# Patient Record
Sex: FEMALE | Race: WHITE | Employment: FULL TIME | URBAN - METROPOLITAN AREA
[De-identification: names, ages, dates, MRNs, and addresses within clinical notes are randomized per-mention and may not be internally consistent; named-entity substitution may affect disease eponyms.]

---

## 2019-07-19 ENCOUNTER — OFFICE VISIT (OUTPATIENT)
Dept: FAMILY MEDICINE CLINIC | Age: 44
End: 2019-07-19
Payer: COMMERCIAL

## 2019-07-19 VITALS
WEIGHT: 116 LBS | HEIGHT: 60 IN | RESPIRATION RATE: 16 BRPM | BODY MASS INDEX: 22.78 KG/M2 | SYSTOLIC BLOOD PRESSURE: 123 MMHG | HEART RATE: 67 BPM | DIASTOLIC BLOOD PRESSURE: 70 MMHG

## 2019-07-19 DIAGNOSIS — Z12.39 SCREENING FOR BREAST CANCER: ICD-10-CM

## 2019-07-19 DIAGNOSIS — Z00.00 ROUTINE CHECK-UP: Primary | ICD-10-CM

## 2019-07-19 PROCEDURE — 99396 PREV VISIT EST AGE 40-64: CPT | Performed by: FAMILY MEDICINE

## 2019-07-19 ASSESSMENT — PATIENT HEALTH QUESTIONNAIRE - PHQ9
SUM OF ALL RESPONSES TO PHQ QUESTIONS 1-9: 0
1. LITTLE INTEREST OR PLEASURE IN DOING THINGS: 0
SUM OF ALL RESPONSES TO PHQ9 QUESTIONS 1 & 2: 0
SUM OF ALL RESPONSES TO PHQ QUESTIONS 1-9: 0
2. FEELING DOWN, DEPRESSED OR HOPELESS: 0

## 2019-07-19 NOTE — PROGRESS NOTES
Chief complaint : 44 year- old presents for well woman exam.     Present illness :    G2, P2,Ab0. Doing well. Periods:  regular. Sexually active : yes  . No abdominal or pelvic pain. No dyspareunia. No abnormal vaginal  discharge. Previous Pap smears normal. Last Pap smear 2016 normal and HPV neg. No urinary or GI symptoms. Medical/ surgical history and medications reviewed. Examination :  Vital signs reviewed. GA : Healthy. Oriented x 3 no distress. HEENT : hearing grossly intact, no jaundice, no oral lesions or nasal discharge. Neck : Supple. Thyroid : normal, no goiter. Breasts : no masses. No skin changes or lymphadenopathy. No nipple discharge. Abdomen :Soft. No masses. No organomegaly. V&V : Normal female external genitalia. BUS: Normal.  PS : Adequate. Cervix : No lesions, pap smear not done. Uterus : Normal size. Adnexa : Free, no masses. BMI was below limits today and the following plan was recommended: regular diet. Assessment / Plan :  No family history of breast cancer  Mammogram  Then every other year                The patient was informed about the importance of regular gynecological  examination and pap smear. She was also  informed of the need for mammogram after the age of 36. After age 48 ,a colonoscopy should be scheduled through her primary care physician (PCP). This will help decrease the risk of colon cancer. During the reproductive years, she should take folic acid daily in order to decrease the risk of neural tube defects such as spina bifida. Adequate calcium and vitamin D intake should be added to a healthy diet ,and weight bearing exercise continued daily for improved cardiovascular and bone health. The patient was reminded of the importance of safe sexual practices including a mutually monogamous relationship and the use of  barrier contraception.   All vaccinations such as flu, tetanus, gardasil ( for cervical cancer ), meningitis, pneumonia and shingles should be updated by her PCP. If she is not established with a PCP, she may schedule an appointment at the medical clinic or at the [facility]. All questions have been answered.

## 2020-01-31 ENCOUNTER — OFFICE VISIT (OUTPATIENT)
Dept: FAMILY MEDICINE CLINIC | Age: 45
End: 2020-01-31
Payer: COMMERCIAL

## 2020-01-31 VITALS
RESPIRATION RATE: 18 BRPM | HEART RATE: 96 BPM | BODY MASS INDEX: 22.07 KG/M2 | OXYGEN SATURATION: 98 % | DIASTOLIC BLOOD PRESSURE: 70 MMHG | SYSTOLIC BLOOD PRESSURE: 108 MMHG | WEIGHT: 113 LBS | TEMPERATURE: 98.2 F

## 2020-01-31 PROCEDURE — 99213 OFFICE O/P EST LOW 20 MIN: CPT | Performed by: FAMILY MEDICINE

## 2020-01-31 NOTE — PROGRESS NOTES
20  Sean Foster : 1975 Sex: female  Age: 38 y.o. Chief Complaint   Patient presents with    Congestion    Sinus Problem    Pharyngitis    Cough     productive at times       HPI  HPI:    Patient presents today with 2 weeks of nasal congestion postnasal drainage. Started with fever body aches for 1 day but resolved. No sinus pain or pressure headaches dizziness or stiff neck but does have a cough that persist, productive at times, no chest pain shortness of breath wheezing or abdominal symptoms. Had flu shot    She denies any chance of being or getting pregnant and not breast-feeding. ROS:  As above    No current outpatient medications on file. No Known Allergies    Past Medical History:   Diagnosis Date    Rhinitis, allergic 2013     Past Surgical History:   Procedure Laterality Date     SECTION      TUBAL LIGATION       Family History   Problem Relation Age of Onset    High Blood Pressure Mother     High Cholesterol Father      Social History     Tobacco Use    Smoking status: Never Smoker    Smokeless tobacco: Never Used   Substance Use Topics    Alcohol use: Yes     Alcohol/week: 1.0 standard drinks     Types: 1 Glasses of wine per week    Drug use: No      Social History     Patient does not qualify to have social determinant information on file (likely too young). Social History Narrative     rosa coyne        Vitals:    20 1639   BP: 108/70   Pulse: 96   Resp: 18   Temp: 98.2 °F (36.8 °C)   TempSrc: Temporal   SpO2: 98%   Weight: 113 lb (51.3 kg)     Wt Readings from Last 3 Encounters:   20 113 lb (51.3 kg)   19 116 lb (52.6 kg)   01/15/18 117 lb (53.1 kg)        Physical Exam    Exam:  Const: Appears comfortable. No signs of acute distress present. Head/Face: Atraumatic, normocephalic on inspection. Eyes: No discharge from the eyes. Sclerae clear.   ENMT:   Ears fluid, nose boggy, oral pharynx thick postnasal drainage no exudate swelling or asymmetry. Neck: Supple. Palpation reveals no adenopathy. No masses appreciated. No JVD. Resp: Respirations are unlabored. Clear to auscultation bilaterally. No rales, rhonchi or wheezes appreciated over the  lungs bilaterally. CV: RRR   Extremities: No clubbing or cyanosis. No edema of the lower limbs  bilaterally. No calf inflammation or tenderness. Abdomen: Abdomen is soft, nontender, and nondistended. No abdominal masses  appreciated. No palpable hepatosplenomegaly. Bowel sounds are normoactive. Skin: Dry and warm with no rash. Muscular skeletal: No acute joint inflammation. Neuro:Grossly intact without focal deficit        Assessment and Plan:    1. Bronchitis  Counseled. Differential reviewed. Antibiotic as per EMR, standard precautions reviewed including C. Difficile. R/B probiotics reviewed. Mucinex as directed with precautions. Potential interactions reviewed. Proper hydration reviewed. Coolmist vaporizer as directed. Mono precautions reviewed. Counseled on nasal saline. Counseled on nasal steroid. -zithromax with precautions incl c-diff, prolonged qt, r/b probiotic reviewed. No problem-specific Assessment & Plan notes found for this encounter. Plan as above. Counseled extensively and differential diagnoses relevant to above were reviewed, including serious etiologies. Side effects and interactions of medications were reviewed. As long as symptoms steadily improve/resolve, and medical conditions follow the expected course, FU next wk, sooner PRN. She heard Dr Marylynn Primrose may be retiring. She may est with me if she would like. No follow-ups on file. Signs and symptoms to watch for discussed, serious signs and symptoms reviewed. ER if any. Blaine Caraballo MD    Patients are advised to check with insurance company to ensure coverage and to fully understand benefits and cost prior to any testing.   This note was created with the assistance of voice recognition software. Document was reviewed however may contain grammatical errors.

## 2020-02-01 RX ORDER — AZITHROMYCIN 250 MG/1
TABLET, FILM COATED ORAL
Qty: 6 TABLET | Refills: 0 | Status: SHIPPED
Start: 2020-02-01 | End: 2020-03-17

## 2020-02-01 RX ORDER — AZITHROMYCIN 250 MG/1
250 TABLET, FILM COATED ORAL DAILY
COMMUNITY
End: 2020-02-01 | Stop reason: SDUPTHER

## 2020-03-17 ENCOUNTER — OFFICE VISIT (OUTPATIENT)
Dept: FAMILY MEDICINE CLINIC | Age: 45
End: 2020-03-17
Payer: COMMERCIAL

## 2020-03-17 VITALS
WEIGHT: 117 LBS | DIASTOLIC BLOOD PRESSURE: 82 MMHG | RESPIRATION RATE: 16 BRPM | HEIGHT: 60 IN | SYSTOLIC BLOOD PRESSURE: 119 MMHG | HEART RATE: 77 BPM | BODY MASS INDEX: 22.97 KG/M2

## 2020-03-17 PROCEDURE — 99396 PREV VISIT EST AGE 40-64: CPT | Performed by: FAMILY MEDICINE

## 2020-03-17 ASSESSMENT — PATIENT HEALTH QUESTIONNAIRE - PHQ9
SUM OF ALL RESPONSES TO PHQ QUESTIONS 1-9: 0
1. LITTLE INTEREST OR PLEASURE IN DOING THINGS: 0
SUM OF ALL RESPONSES TO PHQ QUESTIONS 1-9: 0
2. FEELING DOWN, DEPRESSED OR HOPELESS: 0
SUM OF ALL RESPONSES TO PHQ9 QUESTIONS 1 & 2: 0

## 2020-03-17 NOTE — PROGRESS NOTES
Chief complaint :Papo Ku  here for a yearly physical evaluation . Present illness:    Doing well overall. Concerns : none  Medications : list reviewed and updated. Exercise : yes walking and bike riding  Diet : tries to follow a diet that includes food from a variety of groups while limiting fatty foods and excessive salt . Recent labs reviewed and discussed with patient. Regular menses  Pap tests up to date and normal  Will have mammogram soon. reports that she has never smoked. She has never used smokeless tobacco.   reports current alcohol use of about 1.0 standard drinks of alcohol per week. Past Medical History:   Diagnosis Date    Rhinitis, allergic 2013       Past Surgical History:   Procedure Laterality Date     SECTION      TUBAL LIGATION         Current Outpatient Medications   Medication Sig Dispense Refill    azithromycin (ZITHROMAX) 250 MG tablet Take 2 tablets day 1 then 1 tablet days 2-5 6 tablet 0     No current facility-administered medications for this visit. Family History   Problem Relation Age of Onset    High Blood Pressure Mother     High Cholesterol Father        Allergies:  Patient has no known allergies. Review of systems:    No headaches, no visual disturbances. No weakness, no numbness. No depression, no anxiety. No rhinorrhea. No sore throat, no hearing loss. No cough, no dyspnea. No chest pain on exertion. No edema. No intermittent claudication. No abdominal pain. No nausea, no change in bowel habits. No joint pain, no swelling. No skin lesions, no rash. No heat or cold intolerance. No weight changes. No flank pain, no hematuria. No genital lesions or abnormalities. Examination:      GA: alert, oriented x 3, no distress. HEENT : unremarkable. Nonicteric sclerae. Hearing grossly intact. Oral cavity: no lesions or ulcers. Nasal mucosa: no discharge. Neck : supple, no masses, no goiter and no bruits.   Chest : no deformities. Lungs : clear, no wheezing or crackles. No consolidation. Heart : regular, normal S1S2, no murmurs. Abdomen : soft, no hepatomegaly or splenomegaly. No masses or bruits. Extremities : no edema. Musculoskeletal : no deformities. No effusion or joint tenderness. Back : no deformities and normal ROM. No CVA tenderness. Vascular : normal dorsalis pedis and posterior tibial pulses bilaterally. Skin : no significant rash or lesions. Neurological : normal speech and judgement. Normal gait. No weakness or focal deficits. Psychiatric : well groomed, normal thought content, normal mood. Affect congruent. BMI was below limits today and the following plan was recommended: general diet. No visits with results within 3 Month(s) from this visit. Latest known visit with results is:   Hospital Outpatient Visit on 12/20/2016   Component Date Value    CORRESPONDING PAP CASE # 12/20/2016 SSR-     HPV, High Risk 12/20/2016 NEGATIVE          Assessment / Plan:    Here for yearly check-up. Doing well  Health maintenance is up to date. 1. Vaccinations reviewed with patient. Patient fully informed about indications, risks and alternatives of vaccines. All questions answered. 2. Screening test or procedures reviewed and discussed all as recommended by the USPSTF and other guidelines. Patient fully informed of benefits and risks. All questions answered. Discussed diet and weight loss , encouraged to perform cardio exercise regularly. Anticipatory guidance provided. Follow-up yearly or sooner as needed.

## 2020-05-19 ENCOUNTER — HOSPITAL ENCOUNTER (OUTPATIENT)
Dept: MAMMOGRAPHY | Age: 45
Discharge: HOME | End: 2020-05-21
Payer: COMMERCIAL

## 2020-05-19 ENCOUNTER — HOSPITAL ENCOUNTER (OUTPATIENT)
Age: 45
Discharge: HOME | End: 2020-05-21
Payer: COMMERCIAL

## 2020-05-19 PROCEDURE — 77067 SCR MAMMO BI INCL CAD: CPT

## 2020-05-21 ENCOUNTER — TELEPHONE (OUTPATIENT)
Dept: ONCOLOGY | Age: 45
End: 2020-05-21

## 2020-05-21 NOTE — TELEPHONE ENCOUNTER
Call to patient in reference to her mammogram performed at [address] on May 19, 2020. Instructed patient that the radiologist has recommended some additional breast imaging, in order to make a final determination/result. Informed her that a request for Rx has been faxed to the ordering physician. Once we receive the script a  from [facility] will contact her to schedule the additional imaging study/studies. Verbalizes understanding and is agreeable to proceed.        Jason Fu, RN, BSN, [place] [address] [place] [address]

## 2020-06-03 ENCOUNTER — HOSPITAL ENCOUNTER (OUTPATIENT)
Dept: GENERAL RADIOLOGY | Age: 45
Discharge: HOME | End: 2020-06-05
Payer: COMMERCIAL

## 2020-06-03 PROCEDURE — G0279 TOMOSYNTHESIS, MAMMO: HCPCS

## 2020-06-03 PROCEDURE — 76642 ULTRASOUND BREAST LIMITED: CPT

## 2021-03-24 ENCOUNTER — TELEPHONE (OUTPATIENT)
Dept: FAMILY MEDICINE CLINIC | Age: 46
End: 2021-03-24

## 2021-03-24 NOTE — TELEPHONE ENCOUNTER
Called Pt to see if he is establishing with one of our Dr's or if he has established elsewhere     Left message for a call back

## 2021-07-31 ENCOUNTER — OFFICE VISIT (OUTPATIENT)
Dept: FAMILY MEDICINE CLINIC | Age: 46
End: 2021-07-31
Payer: COMMERCIAL

## 2021-07-31 VITALS
RESPIRATION RATE: 16 BRPM | DIASTOLIC BLOOD PRESSURE: 78 MMHG | HEIGHT: 60 IN | HEART RATE: 81 BPM | SYSTOLIC BLOOD PRESSURE: 118 MMHG | OXYGEN SATURATION: 99 % | WEIGHT: 124 LBS | TEMPERATURE: 97.5 F | BODY MASS INDEX: 24.35 KG/M2

## 2021-07-31 DIAGNOSIS — H66.92 LEFT OTITIS MEDIA, UNSPECIFIED OTITIS MEDIA TYPE: ICD-10-CM

## 2021-07-31 DIAGNOSIS — H61.22 IMPACTED CERUMEN OF LEFT EAR: Primary | ICD-10-CM

## 2021-07-31 PROCEDURE — 69210 REMOVE IMPACTED EAR WAX UNI: CPT | Performed by: PHYSICIAN ASSISTANT

## 2021-07-31 PROCEDURE — 99203 OFFICE O/P NEW LOW 30 MIN: CPT | Performed by: PHYSICIAN ASSISTANT

## 2021-07-31 RX ORDER — AMOXICILLIN 875 MG/1
875 TABLET, COATED ORAL 2 TIMES DAILY
Qty: 20 TABLET | Refills: 0 | Status: SHIPPED | OUTPATIENT
Start: 2021-07-31 | End: 2021-08-10

## 2021-07-31 NOTE — PROGRESS NOTES
21  Rafat Still : 1975 Sex: female  Age 41 y.o. Subjective:  Chief Complaint   Patient presents with    Ear Problem     left ear pain for about a week          HPI:   Rafat Still , 41 y.o. female presents to express care for evaluation of left ear pain    HPI   60-year-old female presents to express care for evaluation of left ear fullness, possible swimmer's ear. The patient was on vacation last week. The patient states that she may have got some water in her left ear. The patient not any chest pain, shortness of breath, back pain, flank pain. The patient is not having any right ear pain. The patient states that she really is not having pain to the left ear other than when she sneezes or burps or causes some increased pressure. The patient states that she has had some difficulty hearing. No drainage or discharge. No nasal congestion, rhinorrhea. ROS:   Unless otherwise stated in this report the patient's positive and negative responses for review of systems for constitutional, eyes, ENT, cardiovascular, respiratory, gastrointestinal, neurological, , musculoskeletal, and integument systems and related systems to the presenting problem are either stated in the history of present illness or were not pertinent or were negative for the symptoms and/or complaints related to the presenting medical problem. Positives and pertinent negatives as per HPI. All others reviewed and are negative.       PMH:     Past Medical History:   Diagnosis Date    Rhinitis, allergic 2013       Past Surgical History:   Procedure Laterality Date     SECTION      TUBAL LIGATION         Family History   Problem Relation Age of Onset    High Blood Pressure Mother     High Cholesterol Father        Medications:     Current Outpatient Medications:     amoxicillin (AMOXIL) 875 MG tablet, Take 1 tablet by mouth 2 times daily for 10 days, Disp: 20 tablet, Rfl: 0   neomycin-polymyxin-hydrocortisone (CORTISPORIN) 3.5-10950-8 otic solution, Place 4 drops into the left ear 3 times daily for 10 days, Disp: 1 Bottle, Rfl: 0    Allergies:   No Known Allergies    Social History:     Social History     Tobacco Use    Smoking status: Never Smoker    Smokeless tobacco: Never Used   Substance Use Topics    Alcohol use: Yes     Alcohol/week: 1.0 standard drinks     Types: 1 Glasses of wine per week    Drug use: No       Patient lives at home. Physical Exam:     Vitals:    07/31/21 0845   BP: 118/78   Pulse: 81   Resp: 16   Temp: 97.5 °F (36.4 °C)   SpO2: 99%   Weight: 124 lb (56.2 kg)   Height: 5' (1.524 m)       Exam:  Physical Exam  Nurse's notes and vital signs reviewed. The patient is not hypoxic. ? General: Alert, no acute distress, patient resting comfortably Patient is not toxic or lethargic. Skin: Warm, intact, no pallor noted. There is no evidence of rash at this time. Head: Normocephalic, atraumatic  Eye: Normal conjunctiva  Ears, Nose, Throat: Right tympanic membrane clear, left tympanic membrane is not able to be visualized because of large amount of cerumen. . No drainage or discharge noted. No pre- or post-auricular tenderness, erythema, or swelling noted. No rhinorrhea or congestion noted. Posterior oropharynx shows no erythema, tonsillar hypertrophy, or exudate. the uvula is midline. No trismus or drooling is noted. Moist mucous membranes. Neck: No anterior/posterior lymphadenopathy noted. No erythema, no masses, no fluctuance or induration noted. No meningeal signs. Cardiovascular: Regular Rate and Rhythm  Respiratory: No acute distress, no rhonchi, wheezing or crackles noted. No stridor or retractions are noted. Neurological: A&O x4, normal speech  Psychiatric: Cooperative         Testing:           Medical Decision Making:     Vital signs reviewed    Past medical history reviewed. Allergies reviewed. Medications reviewed.     Patient on arrival does not appear to be in any apparent distress or discomfort. The patient has been seen and evaluated. The patient does not appear to be toxic or lethargic. The patient had the left tympanic membrane evaluated and there was a large amount of cerumen and was not able to be visualized. The patient consented to the procedure to have irrigation and removal with ear curette. The patient tolerated the procedure well. Repeat evaluation does show quite a bit of irritation and redness noted to the external canal as well as to the tympanic membrane. We will treat the patient with amoxicillin Cortisporin. The patient is to use Motrin, Tylenol, decongestant. Follow-up with PCP. Call with any questions or concerns. The patient is to return to express care or go directly to the emergency department should any of the signs or symptoms worsen. The patient is to followup with primary care physician in 2-3 days for repeat evaluation. The patient has no other questions or concerns at this time the patient will be discharged home. Clinical Impression:   Celeste Cross was seen today for ear problem. Diagnoses and all orders for this visit:    Impacted cerumen of left ear  -     15294 - [place] REMOVE IMPACTED EAR WAX    Left otitis media, unspecified otitis media type    Other orders  -     amoxicillin (AMOXIL) 875 MG tablet; Take 1 tablet by mouth 2 times daily for 10 days  -     neomycin-polymyxin-hydrocortisone (CORTISPORIN) 3.5-36558-9 otic solution; Place 4 drops into the left ear 3 times daily for 10 days        The patient is to call for any concerns or return if any of the signs or symptoms worsen. The patient is to follow-up with PCP in the next 2-3 days for repeat evaluation repeat assessment or go directly to the emergency department.      SIGNATURE: Brandon Márquez III, PA-C

## 2021-09-24 ENCOUNTER — OFFICE VISIT (OUTPATIENT)
Dept: FAMILY MEDICINE CLINIC | Age: 46
End: 2021-09-24
Payer: COMMERCIAL

## 2021-09-24 VITALS
DIASTOLIC BLOOD PRESSURE: 72 MMHG | WEIGHT: 125 LBS | BODY MASS INDEX: 24.41 KG/M2 | HEART RATE: 78 BPM | SYSTOLIC BLOOD PRESSURE: 124 MMHG | OXYGEN SATURATION: 97 % | TEMPERATURE: 97.2 F

## 2021-09-24 DIAGNOSIS — H91.93 BILATERAL HEARING LOSS, UNSPECIFIED HEARING LOSS TYPE: Primary | ICD-10-CM

## 2021-09-24 PROCEDURE — 99213 OFFICE O/P EST LOW 20 MIN: CPT | Performed by: PHYSICIAN ASSISTANT

## 2021-09-24 NOTE — PROGRESS NOTES
21  Katherine Hart : 1975 Sex: female  Age 41 y.o. Subjective:  Chief Complaint   Patient presents with    Hearing Problem     Check ears. Having ear problems          HPI:   Katherine Hart , 41 y.o. female presents to [facility] for evaluation of check ears    HPI  73-year-old female presents to [facility] for evaluation of evaluation of her ears. The patient just want to make sure that she had her ears checked before she goes to the audiologist.  The patient is having some hearing deficits and she is being evaluated for this. She wondered if it was related to wax. She is here to have them evaluated to ensure that there is no significant wax buildup. ROS:   Unless otherwise stated in this report the patient's positive and negative responses for review of systems for constitutional, eyes, ENT, cardiovascular, respiratory, gastrointestinal, neurological, , musculoskeletal, and integument systems and related systems to the presenting problem are either stated in the history of present illness or were not pertinent or were negative for the symptoms and/or complaints related to the presenting medical problem. Positives and pertinent negatives as per HPI. All others reviewed and are negative. PMH:     Past Medical History:   Diagnosis Date    Rhinitis, allergic 2013       Past Surgical History:   Procedure Laterality Date     SECTION      TUBAL LIGATION         Family History   Problem Relation Age of Onset    High Blood Pressure Mother     High Cholesterol Father        Medications:   No current outpatient medications on file. Allergies:   No Known Allergies    Social History:     Social History     Tobacco Use    Smoking status: Never Smoker    Smokeless tobacco: Never Used   Substance Use Topics    Alcohol use:  Yes     Alcohol/week: 1.0 standard drinks     Types: 1 Glasses of wine per week    Drug use: No       Patient lives at problem. Diagnoses and all orders for this visit:    Bilateral hearing loss, unspecified hearing loss type        The patient is to call for any concerns or return if any of the signs or symptoms worsen. The patient is to follow-up with PCP in the next 2-3 days for repeat evaluation repeat assessment or go directly to the emergency department.      SIGNATURE: Christos Bucio III, PA-C

## 2022-09-27 ENCOUNTER — OFFICE VISIT (OUTPATIENT)
Dept: FAMILY MEDICINE CLINIC | Age: 47
End: 2022-09-27
Payer: COMMERCIAL

## 2022-09-27 VITALS
HEART RATE: 80 BPM | RESPIRATION RATE: 18 BRPM | HEIGHT: 60 IN | BODY MASS INDEX: 25.72 KG/M2 | WEIGHT: 131 LBS | SYSTOLIC BLOOD PRESSURE: 103 MMHG | OXYGEN SATURATION: 98 % | TEMPERATURE: 97.9 F | DIASTOLIC BLOOD PRESSURE: 73 MMHG

## 2022-09-27 DIAGNOSIS — Z00.00 PREVENTATIVE HEALTH CARE: Primary | ICD-10-CM

## 2022-09-27 DIAGNOSIS — F41.8 SITUATIONAL ANXIETY: ICD-10-CM

## 2022-09-27 PROCEDURE — 99396 PREV VISIT EST AGE 40-64: CPT | Performed by: FAMILY MEDICINE

## 2022-09-27 RX ORDER — FLUOXETINE 10 MG/1
10 CAPSULE ORAL DAILY
Qty: 30 CAPSULE | Refills: 3 | Status: SHIPPED | OUTPATIENT
Start: 2022-09-27

## 2022-09-27 SDOH — ECONOMIC STABILITY: FOOD INSECURITY: WITHIN THE PAST 12 MONTHS, YOU WORRIED THAT YOUR FOOD WOULD RUN OUT BEFORE YOU GOT MONEY TO BUY MORE.: NEVER TRUE

## 2022-09-27 SDOH — ECONOMIC STABILITY: FOOD INSECURITY: WITHIN THE PAST 12 MONTHS, THE FOOD YOU BOUGHT JUST DIDN'T LAST AND YOU DIDN'T HAVE MONEY TO GET MORE.: NEVER TRUE

## 2022-09-27 ASSESSMENT — PATIENT HEALTH QUESTIONNAIRE - PHQ9
2. FEELING DOWN, DEPRESSED OR HOPELESS: 1
SUM OF ALL RESPONSES TO PHQ QUESTIONS 1-9: 1
1. LITTLE INTEREST OR PLEASURE IN DOING THINGS: 0
SUM OF ALL RESPONSES TO PHQ9 QUESTIONS 1 & 2: 1
SUM OF ALL RESPONSES TO PHQ QUESTIONS 1-9: 1

## 2022-09-27 ASSESSMENT — ANXIETY QUESTIONNAIRES
3. WORRYING TOO MUCH ABOUT DIFFERENT THINGS: 1
1. FEELING NERVOUS, ANXIOUS, OR ON EDGE: 1
7. FEELING AFRAID AS IF SOMETHING AWFUL MIGHT HAPPEN: 0
GAD7 TOTAL SCORE: 7
2. NOT BEING ABLE TO STOP OR CONTROL WORRYING: 1
4. TROUBLE RELAXING: 2
5. BEING SO RESTLESS THAT IT IS HARD TO SIT STILL: 1
6. BECOMING EASILY ANNOYED OR IRRITABLE: 1

## 2022-09-27 ASSESSMENT — SOCIAL DETERMINANTS OF HEALTH (SDOH): HOW HARD IS IT FOR YOU TO PAY FOR THE VERY BASICS LIKE FOOD, HOUSING, MEDICAL CARE, AND HEATING?: NOT HARD AT ALL

## 2022-09-27 NOTE — PROGRESS NOTES
Ines Escobedo (:  1975) is a 40 y.o. female,New patient, here for evaluation of the following chief complaint(s): Annual Exam         ASSESSMENT/PLAN:  1. Preventative health care  -     Lipid Panel; Future  -     Comprehensive Metabolic Panel; Future  -     TSH; Future  -     CBC with Auto Differential; Future  -     Fecal DNA Colorectal cancer screening (Cologuard)  Ed pt on options for colon cancer screening. Pt would prefer cologuard. Pt to RTC for pap. Will get flu anc COVID vaccines at pharmacy. 2. Situational anxiety  -     Comprehensive Metabolic Panel; Future  -     TSH; Future  -     CBC with Auto Differential; Future  -     FLUoxetine (PROZAC) 10 MG capsule; Take 1 capsule by mouth daily, Disp-30 capsule, R-3Normal  Labs ordered. Low dose SSRI started. Pt already fatigued so more activating SSRI started. Will reassess when she RTC for pap. Return in about 3 months (around 2022) for pap. Subjective   SUBJECTIVE/OBJECTIVE:  HPI    Prev: labs, colon cancer screening, cervical cancer screening, depression screening, vaccines. Will get flu and COVID vaccine at the same time at the pharmacy. Needs pap smear. Will RTC for one. Has not had in many years. Anxeity is high some times. Sleeps ok as long as she is not woken up. Can feel anxiety in her chest sometimes. Child has severe illness and has nightmares at night. She had to stop her job in order to care for her child and is now home schooling her other child. Older child is a senior in high school. Younger child is a sophomore and has been ill for about 2 years. She has gotten counseling for it but is not currently in counseling. ARI 7 SCORE 2022   ARI-7 Total Score 7     Interpretation of ARI-7 score: 5-9 = mild anxiety, 10-14 = moderate anxiety, 15+ = severe anxiety. Recommend referral to behavioral health for scores 10 or greater.        Review of Systems   Constitutional:  Positive for fatigue. Negative for activity change, fever and unexpected weight change. Not exercing as much as she used to because of the stress at home. Respiratory:  Negative for cough, chest tightness and shortness of breath. Cardiovascular:  Negative for chest pain and leg swelling. Gastrointestinal:  Negative for abdominal pain, constipation, diarrhea and nausea. Genitourinary:  Negative for difficulty urinating. Psychiatric/Behavioral:  Positive for sleep disturbance. Negative for agitation and dysphoric mood. The patient is nervous/anxious. Very social person - has found ways to stay active socially with friends, in [employer], volunteering. Can't commit to a lot because she has to be available at home. Objective   Physical Exam  Vitals and nursing note reviewed. Constitutional:       Appearance: Normal appearance. She is well-developed. She is not ill-appearing or toxic-appearing. HENT:      Head: Normocephalic and atraumatic. Eyes:      General: No scleral icterus. Right eye: No discharge. Left eye: No discharge. Neck:      Thyroid: No thyromegaly. Vascular: No carotid bruit. Cardiovascular:      Rate and Rhythm: Normal rate and regular rhythm. Heart sounds: Normal heart sounds, S1 normal and S2 normal. No murmur heard. No friction rub. No gallop. Pulmonary:      Effort: Pulmonary effort is normal. No respiratory distress. Breath sounds: Normal breath sounds and air entry. No decreased breath sounds, wheezing or rales. Abdominal:      General: Abdomen is flat. Palpations: Abdomen is soft. There is no mass. Tenderness: There is no abdominal tenderness. Musculoskeletal:      Cervical back: Neck supple. Right lower leg: No edema. Left lower leg: No edema. Lymphadenopathy:      Cervical: No cervical adenopathy. Skin:     General: Skin is warm and dry. Neurological:      General: No focal deficit present. Mental Status: She is alert and oriented to person, place, and time. Gait: Gait normal.   Psychiatric:         Attention and Perception: Attention normal.         Mood and Affect: Mood and affect normal.         Speech: Speech normal.         Behavior: Behavior normal. Behavior is cooperative. Thought Content: Thought content normal.         Cognition and Memory: Cognition and memory normal.         Judgment: Judgment normal.                An electronic signature was used to authenticate this note.     --Shannan Jang MD

## 2022-09-27 NOTE — PATIENT INSTRUCTIONS
Call insurance company and ask about Cologuard. Do they pay for it? Get COVID Booster and Flu vaccine together.

## 2022-09-28 PROBLEM — F41.1 GENERALIZED ANXIETY DISORDER: Status: ACTIVE | Noted: 2022-09-28

## 2022-09-28 PROBLEM — F41.1 GENERALIZED ANXIETY DISORDER: Status: RESOLVED | Noted: 2022-09-28 | Resolved: 2022-09-28

## 2022-09-28 PROBLEM — F41.8 SITUATIONAL ANXIETY: Status: ACTIVE | Noted: 2022-09-28

## 2022-09-28 ASSESSMENT — ENCOUNTER SYMPTOMS
NAUSEA: 0
SHORTNESS OF BREATH: 0
ABDOMINAL PAIN: 0
CONSTIPATION: 0
DIARRHEA: 0
COUGH: 0
CHEST TIGHTNESS: 0

## 2022-10-05 ENCOUNTER — NURSE ONLY (OUTPATIENT)
Dept: FAMILY MEDICINE CLINIC | Age: 47
End: 2022-10-05
Payer: COMMERCIAL

## 2022-10-05 DIAGNOSIS — F41.8 SITUATIONAL ANXIETY: ICD-10-CM

## 2022-10-05 DIAGNOSIS — Z00.00 PREVENTATIVE HEALTH CARE: ICD-10-CM

## 2022-10-05 LAB
ALBUMIN SERPL-MCNC: 4.4 G/DL (ref 3.5–5.2)
ALP BLD-CCNC: 55 U/L (ref 35–104)
ALT SERPL-CCNC: 17 U/L (ref 0–32)
ANION GAP SERPL CALCULATED.3IONS-SCNC: 13 MMOL/L (ref 7–16)
AST SERPL-CCNC: 22 U/L (ref 0–31)
BASOPHILS ABSOLUTE: 0.04 E9/L (ref 0–0.2)
BASOPHILS RELATIVE PERCENT: 1 % (ref 0–2)
BILIRUB SERPL-MCNC: 0.3 MG/DL (ref 0–1.2)
BUN BLDV-MCNC: 9 MG/DL (ref 6–20)
CALCIUM SERPL-MCNC: 9 MG/DL (ref 8.6–10.2)
CHLORIDE BLD-SCNC: 102 MMOL/L (ref 98–107)
CHOLESTEROL, TOTAL: 177 MG/DL (ref 0–199)
CO2: 24 MMOL/L (ref 22–29)
CREAT SERPL-MCNC: 0.6 MG/DL (ref 0.5–1)
EOSINOPHILS ABSOLUTE: 0.13 E9/L (ref 0.05–0.5)
EOSINOPHILS RELATIVE PERCENT: 3.3 % (ref 0–6)
GFR AFRICAN AMERICAN: >60
GFR NON-AFRICAN AMERICAN: >60 ML/MIN/1.73
GLUCOSE BLD-MCNC: 93 MG/DL (ref 74–99)
HCT VFR BLD CALC: 39.5 % (ref 34–48)
HDLC SERPL-MCNC: 60 MG/DL
HEMOGLOBIN: 12.5 G/DL (ref 11.5–15.5)
IMMATURE GRANULOCYTES #: 0.01 E9/L
IMMATURE GRANULOCYTES %: 0.3 % (ref 0–5)
LDL CHOLESTEROL CALCULATED: 107 MG/DL (ref 0–99)
LYMPHOCYTES ABSOLUTE: 1.28 E9/L (ref 1.5–4)
LYMPHOCYTES RELATIVE PERCENT: 32.4 % (ref 20–42)
MCH RBC QN AUTO: 29.1 PG (ref 26–35)
MCHC RBC AUTO-ENTMCNC: 31.6 % (ref 32–34.5)
MCV RBC AUTO: 92.1 FL (ref 80–99.9)
MONOCYTES ABSOLUTE: 0.31 E9/L (ref 0.1–0.95)
MONOCYTES RELATIVE PERCENT: 7.8 % (ref 2–12)
NEUTROPHILS ABSOLUTE: 2.18 E9/L (ref 1.8–7.3)
NEUTROPHILS RELATIVE PERCENT: 55.2 % (ref 43–80)
PDW BLD-RTO: 12.4 FL (ref 11.5–15)
PLATELET # BLD: 319 E9/L (ref 130–450)
PMV BLD AUTO: 9.8 FL (ref 7–12)
POTASSIUM SERPL-SCNC: 4.3 MMOL/L (ref 3.5–5)
RBC # BLD: 4.29 E12/L (ref 3.5–5.5)
SODIUM BLD-SCNC: 139 MMOL/L (ref 132–146)
TOTAL PROTEIN: 7.1 G/DL (ref 6.4–8.3)
TRIGL SERPL-MCNC: 52 MG/DL (ref 0–149)
TSH SERPL DL<=0.05 MIU/L-ACNC: 1.65 UIU/ML (ref 0.27–4.2)
VLDLC SERPL CALC-MCNC: 10 MG/DL
WBC # BLD: 4 E9/L (ref 4.5–11.5)

## 2022-10-05 PROCEDURE — 36415 COLL VENOUS BLD VENIPUNCTURE: CPT | Performed by: FAMILY MEDICINE

## 2022-12-09 ENCOUNTER — TELEPHONE (OUTPATIENT)
Dept: FAMILY MEDICINE CLINIC | Age: 47
End: 2022-12-09

## 2023-01-05 NOTE — PROGRESS NOTES
Annual GYN Visit      Elizabeth Kirby  YOB: 1975    Date of Service:  1/6/2023    Chief Complaint:   Elizabeth Kirby is a 56 y.o. female who presents for routine annual gynecologic visit. HPI:  Patient is premenopausal- No LMP recorded. .  She denies irregular menses, heavy menses, dysmenorrhea, amenorrhea, pelvic pain, genital ulcers, vaginal discharge, vulvar/vaginal itching, vulvar/vaginal irritation/pain, urinary frequency, urinary urgency, and urinary incontinence. Menopausal/perimenopausal symptoms: none. Hormone therapy side effects: not applicable. Started proscac. How is it going. It is better in terms of her anxiety - heaviness in chest is better but not gone. Still some trouble sleeping. Got out to go to work at [employer]. Liked this because she is an extrovert and it got her out of the house. Will get flu vaccine today. Still needs to turn in the cologuard. Has it at home. No Known Allergies  Outpatient Medications Marked as Taking for the 1/6/23 encounter (Office Visit) with Alexi Woodruff MD   Medication Sig Dispense Refill    FLUoxetine (PROZAC) 20 MG capsule Take 1 capsule by mouth daily 90 capsule 1       Preventive Care and Risk Factor Assessment  Health Maintenance   Topic Date Due    COVID-19 Vaccine (1) Never done    HIV screen  Never done    Hepatitis C screen  Never done    Colorectal Cancer Screen  Never done    Cervical cancer screen  12/20/2021    Depression Screen  09/27/2023    DTaP/Tdap/Td vaccine (2 - Td or Tdap) 12/21/2026    Lipids  10/05/2027    Flu vaccine  Completed    Hepatitis A vaccine  Aged Out    Hib vaccine  Aged Out    Meningococcal (ACWY) vaccine  Aged Out    Pneumococcal 0-64 years Vaccine  Aged Out      Hx abnormal PAP: no  Sexual activity: single partner, contraception - tubal ligation.   Hx of STD: no  Hx of abnormal mammogram: no  Self-breast exams: no  FH of breast cancer: no  FH of GYN cancer: no   Previous DEXA scan: no  Exercise: walks 5 time(s) per week and weight training  5 time(s) per week  Social History     Tobacco Use   Smoking Status Never   Smokeless Tobacco Never      Social History     Substance and Sexual Activity   Alcohol Use Yes    Alcohol/week: 1.0 standard drink    Types: 1 Glasses of wine per week        Immunization History   Administered Date(s) Administered    Influenza, FLUARIX, FLULAVAL, FLUZONE (age 10 mo+) AND AFLURIA, (age 1 y+), PF, 0.5mL 11/23/2016    Influenza, FLUCELVAX, (age 10 mo+), MDCK, PF, 0.5mL 01/06/2023    Influenza, High Dose (Fluzone 65 yrs and older) 12/11/2014    Tdap (Boostrix, Adacel) 12/21/2016       Review of Systems:  A comprehensive review of systems was negative except for what was noted in the HPI. Wt Readings from Last 3 Encounters:   01/06/23 134 lb (60.8 kg)   09/27/22 131 lb (59.4 kg)   09/24/21 125 lb (56.7 kg)     BP Readings from Last 3 Encounters:   01/06/23 114/69   09/27/22 103/73   09/24/21 124/72       Physical Exam:  Vitals:    01/06/23 1117   BP: 114/69   Pulse: 75   Resp: 18   Temp: 97.8 °F (36.6 °C)   SpO2: 98%   Weight: 134 lb (60.8 kg)   Height: 5' (1.524 m)      Body mass index is 26.17 kg/m². Constitutional: She is oriented to person, place, and time. She appears well-developed and well-nourished. No distress. Abdominal: Soft, non-tender. No distension or masses. Genitourinary: urethral meatus normal and bladder not palpable, normal appearing vulva with no masses, tenderness or lesions, Vagina:  normal mucosa without prolapse or lesions, Cervix:  normal with minimal dark blood from os, Uterus:  normal single, nontender, and Adnexa:  normal bimanual exam.  Breast exam:  breasts appear normal, no suspicious masses, no skin or nipple changes or axillary nodes. Neurological: She is alert and oriented to person, place, and time. Skin: Skin is warm and dry. No rash noted. No erythema. Psychiatric: She has a normal mood and affect.  Her behavior is normal.     Assessment/Plan:  Carmen Lal was seen today for gynecologic exam.    Diagnoses and all orders for this visit:    Well woman exam  Pap today with HPV co-testing. Mammo ordered  Pt has cologuard at home. Flu vaccine given today. Encounter for screening mammogram for malignant neoplasm of breast  -     SANDIE DIGITAL SCREEN BILATERAL PER PROTOCOL; Future    Situational anxiety  -     FLUoxetine (PROZAC) 20 MG capsule; Take 1 capsule by mouth daily  Prozac increased from 10 to 20 mg. Will follow up in 6 months.     Other orders  -     Influenza, FLUCELVAX, (age 10 mo+), IM, Preservative Free, 0.5 mL

## 2023-01-06 ENCOUNTER — OFFICE VISIT (OUTPATIENT)
Dept: FAMILY MEDICINE CLINIC | Age: 48
End: 2023-01-06
Payer: COMMERCIAL

## 2023-01-06 VITALS
WEIGHT: 134 LBS | OXYGEN SATURATION: 98 % | RESPIRATION RATE: 18 BRPM | DIASTOLIC BLOOD PRESSURE: 69 MMHG | HEIGHT: 60 IN | SYSTOLIC BLOOD PRESSURE: 114 MMHG | HEART RATE: 75 BPM | BODY MASS INDEX: 26.31 KG/M2 | TEMPERATURE: 97.8 F

## 2023-01-06 DIAGNOSIS — F41.8 SITUATIONAL ANXIETY: ICD-10-CM

## 2023-01-06 DIAGNOSIS — Z12.31 ENCOUNTER FOR SCREENING MAMMOGRAM FOR MALIGNANT NEOPLASM OF BREAST: ICD-10-CM

## 2023-01-06 DIAGNOSIS — Z01.419 WELL WOMAN EXAM: Primary | ICD-10-CM

## 2023-01-06 PROCEDURE — 99396 PREV VISIT EST AGE 40-64: CPT | Performed by: FAMILY MEDICINE

## 2023-01-06 PROCEDURE — 90674 CCIIV4 VAC NO PRSV 0.5 ML IM: CPT | Performed by: FAMILY MEDICINE

## 2023-01-06 PROCEDURE — 90471 IMMUNIZATION ADMIN: CPT | Performed by: FAMILY MEDICINE

## 2023-01-06 RX ORDER — FLUOXETINE HYDROCHLORIDE 20 MG/1
20 CAPSULE ORAL DAILY
Qty: 90 CAPSULE | Refills: 1 | Status: SHIPPED | OUTPATIENT
Start: 2023-01-06

## 2023-01-06 ASSESSMENT — PATIENT HEALTH QUESTIONNAIRE - PHQ9
SUM OF ALL RESPONSES TO PHQ QUESTIONS 1-9: 2
SUM OF ALL RESPONSES TO PHQ9 QUESTIONS 1 & 2: 2
1. LITTLE INTEREST OR PLEASURE IN DOING THINGS: 1
2. FEELING DOWN, DEPRESSED OR HOPELESS: 1

## 2023-01-11 ENCOUNTER — PATIENT MESSAGE (OUTPATIENT)
Dept: FAMILY MEDICINE CLINIC | Age: 48
End: 2023-01-11

## 2023-01-11 DIAGNOSIS — F41.8 SITUATIONAL ANXIETY: ICD-10-CM

## 2023-01-11 RX ORDER — FLUOXETINE HYDROCHLORIDE 20 MG/1
20 CAPSULE ORAL DAILY
Qty: 90 CAPSULE | Refills: 1 | Status: SHIPPED | OUTPATIENT
Start: 2023-01-11

## 2023-01-11 NOTE — TELEPHONE ENCOUNTER
From: Gladys Hopper  To: Dr. Ben Gordillo: 1/11/2023 10:17 AM EST  Subject: Questions    Thank you for letting me know my test results. May you please send the new dosage of my medication to [facility] pharmacy in [place] as they are now connected to my husbands insurance? Also am I able to have an occasional alcoholic beverage while I'm taking this medicine? I didn't know if that would be a hindrance of any kind in what the medicine is supposed to helping with?

## 2023-05-31 ENCOUNTER — PATIENT MESSAGE (OUTPATIENT)
Dept: FAMILY MEDICINE CLINIC | Age: 48
End: 2023-05-31

## 2023-05-31 DIAGNOSIS — F41.8 SITUATIONAL ANXIETY: ICD-10-CM

## 2023-05-31 NOTE — TELEPHONE ENCOUNTER
From: Isabel Patino  To: Dr. Ferreira Her: 5/31/2023 2:16 PM EDT  Subject: Refill of medication    Thank you Dr Paulette Jackson for your loving care and support for our family! May you please help to send in a refill to [address] [place] [address] for my daily mediation of Floxedine? I'm just about out. Thank you!   Sincerely,   Roberta Davis

## 2023-06-01 RX ORDER — FLUOXETINE HYDROCHLORIDE 20 MG/1
20 CAPSULE ORAL DAILY
Qty: 90 CAPSULE | Refills: 1 | Status: SHIPPED | OUTPATIENT
Start: 2023-06-01

## 2023-07-02 DIAGNOSIS — F41.8 SITUATIONAL ANXIETY: ICD-10-CM

## 2023-07-03 RX ORDER — FLUOXETINE HYDROCHLORIDE 20 MG/1
CAPSULE ORAL
Qty: 90 CAPSULE | Refills: 0 | Status: SHIPPED | OUTPATIENT
Start: 2023-07-03